# Patient Record
Sex: FEMALE | URBAN - METROPOLITAN AREA
[De-identification: names, ages, dates, MRNs, and addresses within clinical notes are randomized per-mention and may not be internally consistent; named-entity substitution may affect disease eponyms.]

---

## 2017-06-01 ENCOUNTER — HOSPITAL ENCOUNTER (OUTPATIENT)
Dept: LAB | Age: 42
Discharge: HOME | End: 2017-06-01

## 2017-06-01 ENCOUNTER — OFFICE VISIT (OUTPATIENT)
Dept: DERMATOLOGY | Facility: AMBULATORY SURGERY CENTER | Age: 42
End: 2017-06-01

## 2017-06-01 VITALS
SYSTOLIC BLOOD PRESSURE: 108 MMHG | RESPIRATION RATE: 18 BRPM | HEART RATE: 87 BPM | WEIGHT: 180 LBS | DIASTOLIC BLOOD PRESSURE: 64 MMHG | HEIGHT: 66 IN | OXYGEN SATURATION: 97 % | BODY MASS INDEX: 28.93 KG/M2 | TEMPERATURE: 98 F

## 2017-06-01 DIAGNOSIS — Z80.8 FAMILY HISTORY OF SKIN CANCER: ICD-10-CM

## 2017-06-01 DIAGNOSIS — L91.8 CUTANEOUS SKIN TAGS: ICD-10-CM

## 2017-06-01 DIAGNOSIS — D18.01 CHERRY ANGIOMA: ICD-10-CM

## 2017-06-01 DIAGNOSIS — D48.5 NEOPLASM OF UNCERTAIN BEHAVIOR OF SKIN OF NECK: Primary | ICD-10-CM

## 2017-06-01 DIAGNOSIS — L81.4 LENTIGINES: ICD-10-CM

## 2017-06-01 DIAGNOSIS — L82.1 SEBORRHEIC KERATOSES: ICD-10-CM

## 2017-06-01 DIAGNOSIS — D48.5 NEOPLASM OF UNCERTAIN BEHAVIOR OF SKIN OF BACK: ICD-10-CM

## 2017-06-01 DIAGNOSIS — D22.9 MULTIPLE BENIGN NEVI: ICD-10-CM

## 2017-06-01 NOTE — MR AVS SNAPSHOT
Visit Information Date & Time Provider Department Dept. Phone Encounter #  
 6/1/2017  9:00 AM [facility] [address] [phone] [id number] Upcoming Health Maintenance Date Due DTaP/Tdap/Td series (1 - Tdap) 1/18/1994 PAP AKA CERVICAL CYTOLOGY 1/18/1994 INFLUENZA AGE 9 TO ADULT 8/1/2017 Allergies as of 6/1/2017  Review Complete On: 6/1/2017 By: Miguel Monique LPN Severity Noted Reaction Type Reactions Pitocin [Oxytocin]  06/01/2017    Palpitations Current Immunizations  Never Reviewed No immunizations on file. Not reviewed this visit Vitals BP Pulse Temp Resp Height(growth percentile) Weight(growth percentile) 108/64 87 98 °F (36.7 °C) (Oral) 18 5' 6\" (1.676 m) 180 lb (81.6 kg) SpO2 BMI OB Status Smoking Status 97% 29.05 kg/m2 Having regular periods Never Smoker BMI and BSA Data Body Mass Index Body Surface Area 29.05 kg/m 2 1.95 m 2 Your Updated Medication List  
  
   
This list is accurate as of: 6/1/17  9:28 AM.  Always use your most recent med list.  
  
  
  
  
 LAMICTAL ODT PO Take  by mouth. LITHIUM ASPARTATE PO Take  by mouth. TRILEPTAL PO Take  by mouth. Patient Instructions Self Skin Exam and Sunscreens Early detection and treatment is essential in the treatment of all forms of skin cancer. If caught early, all forms of skin cancer are curable. In addition to your regular visits, you should perform a monthly skin examination. Over time, you become familiar with what is normally found on your skin and can identify new or suspicious spots. One of the screening tools you can use to assess your skin is to follow the ABCDEs: 
 
A= Asymmetry (One half is unlike the other half) B= Border (An irregular, scalloped or poorly defined edge) C= Color (Is varied from one area to another, has shades of tan, brown/ black,       white, red or blue) D= Diameter (Spots larger than 6mm or a pencil eraser) E= Evolving (New spots or one that is changing in size, shape, or color) A follow- up interval will be customized based on your history of skin cancer or level of skin damage and risk factors. In any case, if you notice a suspicious or new spot, an appointment should be arranged between regular visits. Everyone should use sunscreen and sun-safe practices, which is especially important for those with a personal or family history of skin cancer. Suggestions for this include: 1. Use daily moisturizers containing SPF 30 or higher. 2. Wear long sleeve clothing with UPF ratings and a broad-brimmed hat. 3. Apply sunscreen with SPF 30 or higher to all sun exposed areas if you are going to be in the sun. A broad spectrum UVA/ UVB sunscreen is best.  Dont forget to REAPPLY every two hours or more often if swimming or sweating! 4. Avoid outside activities during peak sun hours, especially in the summer (10am- 2pm). 5. DO NOT use tanning beds. Using sunscreen and sun-safe practices can help reduce the likelihood of developing skin cancer or additional skin cancers in those previously diagnosed. Introducing [facility]! Rafal Coughlin introduces [employer] patient portal. Now you can access parts of your medical record, email your doctor's office, and request medication refills online. 1. In your internet browser, go to [email or web address] 2. Click on the First Time User? Click Here link in the Sign In box. You will see the New Member Sign Up page. 3. Enter your [employer] Access Code exactly as it appears below. You will not need to use this code after youve completed the sign-up process. If you do not sign up before the expiration date, you must request a new code. · [employer] Access Code: [email or web address] Expires: 8/30/2017  9:28 AM 
 
 4. Enter the last four digits of your Social Security Number (xxxx) and Date of Birth (mm/dd/yyyy) as indicated and click Submit. You will be taken to the next sign-up page. 5. Create a [employer] ID. This will be your [employer] login ID and cannot be changed, so think of one that is secure and easy to remember. 6. Create a [employer] password. You can change your password at any time. 7. Enter your Password Reset Question and Answer. This can be used at a later time if you forget your password. 8. Enter your e-mail address. You will receive e-mail notification when new information is available in [address] 9. Click Sign Up. You can now view and download portions of your medical record. 10. Click the Download Summary menu link to download a portable copy of your medical information. If you have questions, please visit the Frequently Asked Questions section of the [employer] website. Remember, [employer] is NOT to be used for urgent needs. For medical emergencies, dial 911. Now available from your iPhone and Android! Please provide this summary of care documentation to your next provider. If you have any questions after today's visit, please call [phone].

## 2017-06-01 NOTE — PROGRESS NOTES
Name: Pauly Jovel       Age: 38 y.o. Date: 6/1/2017    Chief Complaint:   Chief Complaint   Patient presents with    Skin Exam     area of concern right cheek, skin exam       Subjective:    HPI  Ms. Pauly Jovel is a 38 y.o. female who presents as a new patient to [place] [address] for a skin exam.  The patient was referred for this evaluation by her father. The patient has not had a skin exam in the past and the patient does have current complaints related to her skin. She notices a dark lesion that has grown and changed colors on the right neck. She is also noticed a scaly lesion on the left upper forehead or hairline. She states she has skin tags on her neck that has occurred since the birth of her son about 9 months ago. Ms. Pauly Jovel is feeling well and in her usual state of health today. The patient's pertinent skin history includes: No personal history of skin cancer but does have a family history of skin cancer in her father. Admits to sun exposure and freckles. ROS: Constitutional: Negative. Dermatological :positive for - skin lesion changes    Social History     Social History    Marital status:      Spouse name: N/A    Number of children: N/A    Years of education: N/A     Occupational History    Not on file. Social History Main Topics    Smoking status: Never Smoker    Smokeless tobacco: Not on file    Alcohol use No    Drug use: No    Sexual activity: Not on file     Other Topics Concern    Not on file     Social History Narrative    No narrative on file       History reviewed. No pertinent family history. Past Medical History:   Diagnosis Date    Bipolar 1 disorder ([facility] [address]     Family history of skin cancer     Dad BCC    Sun-damaged skin        History reviewed. No pertinent surgical history. Current Outpatient Prescriptions   Medication Sig Dispense Refill    LITHIUM ASPARTATE PO Take  by mouth.       LAMOTRIGINE (LAMICTAL ODT PO) Take  by mouth.  OXCARBAZEPINE (TRILEPTAL PO) Take  by mouth. Allergies   Allergen Reactions    Pitocin [Oxytocin] Palpitations         Objective:    Visit Vitals    /64    Pulse 87    Temp 98 °F (36.7 °C) (Oral)    Resp 18    Ht 5' 6\" (1.676 m)    Wt 81.6 kg (180 lb)    SpO2 97%    BMI 29.05 kg/m2       Katiuska Ko is a 38 y.o. female who appears well and in no distress. She is awake, alert, and oriented. There is no preauricular, submandibular, or cervical lymphadenopathy. A skin examination was performed including her scalp, face (including eyelid), ears, neck, chest, back, abdomen, upper extremities (including digits/nails), lower extremities, breasts, buttocks; genital skin was not examined. There are lentigines on sun exposed areas. On the right lateral neck is a keratotic dark brown pigmented papule, 9 x 7 mm consistent with a seborrheic keratosis, inflamed. There is another scaly papule on the right upper forehead consistent with an additional seborrheic keratosis. She has skin tags on the neck. There are scattered cherry angiomas and other non inflamed waxy macules and keratotic papules consistent with seborrheic keratoses. She has medium brown and dark brown junctional nevi without concerning features. On the upper back is a 4 x 3 mm pearly papule with plenty changes, intradermal nevus versus basal cell carcinomas considered. Assessment/Plan:  1. Neoplasm of Uncertain Behavior, right lateral neck, favor SK. The differential diagnoses were discussed. A shave removal was advised to address this lesion. The procedure was reviewed and verbal and written consent were obtained. The risks of pain, bleeding, infection, recurrence and scar were discussed. I performed the procedure. The site was cleansed and anesthetized with 1% Lidocaine with Epinephrine 1:100,000. A shave removal was performed to remove the lesion in its clinical entirety.   Drysol was used for hemostasis. The wound was bandaged and care reviewed. The specimen was sent to pathology. I will contact the patient with the results and any further treatment that may be necessary. 2.Neoplasm of Uncertain Behavior, upper back, r/o BCC vs nevus. The differential diagnoses were discussed. Curettage was advised to address this lesion with malignant destruction. The procedure was reviewed and verbal and written consent were obtained. The risks of pain, bleeding, infection, scar, and recurrence of the lesion were discussed. I performed the procedure. The site was cleansed and anesthetized with 1% Lidocaine with Epinephrine 1:100,000. Curettage was performed by me to include a 2 mm margin, resulting in a post curettage defect size of 8 x 7 mm. Drysol was used for hemostasis. The wound was bandaged and care reviewed. The specimen was sent to pathology. I will contact the patient with the results and any further treatment that may be necessary. 3.Solar lentigos. The diagnosis and relationship to sun exposure was reviewed. Sun protection advised. 4. Seborrheic keratoses. The diagnosis was reviewed and the patient was reassured that no treatment is needed for these benign lesions. 5.Cherry angiomas. The diagnosis was reviewed and the patient was reassured that no treatment is needed for these benign lesions. 6. Normal nevi. The diagnosis of normal nevi was reviewed. I discussed sun protection, sunscreen use, the warning signs of skin cancer, the need for self-skin examinations, and the need for regular practitioner exams every 1 year. The patient should follow up sooner as needed if new, changing, or symptomatic skin lesions arise. 7. Family history of skin cancer. I discussed sun protection, sunscreen use, the warning signs of skin cancer, the need for self-skin examinations, and the need for regular practitioner exams every 1 year.   The patient should follow up sooner as needed if new, changing, or symptomatic skin lesions arise. [facility]   OFFICE PROCEDURE PROGRESS NOTE   Chart reviewed for the following:   Celso MARTINEZ, have reviewed the History, Physical and updated the Allergic reactions for [address] performed immediately prior to start of procedure:   Celso MARTINEZ, have performed the following reviews on Sarahy Lynn   prior to the start of the procedure:     * Patient was identified by name and date of birth   * Agreement on procedure being performed was verified   * Risks and Benefits explained to the patient   * Procedure site verified and marked as necessary   * Patient was positioned for comfort   * Consent was signed and verified     Time: 0935  Date of procedure: 6/1/2017  Procedure performed by: Kristin Landis.  Mercedes Antonio  Provider assisted by: lpn   Patient assisted by: self   How tolerated by patient: tolerated the procedure well with no complications   Comments: none

## 2017-06-06 ENCOUNTER — TELEPHONE (OUTPATIENT)
Dept: DERMATOLOGY | Facility: AMBULATORY SURGERY CENTER | Age: 42
End: 2017-06-06

## 2017-06-06 NOTE — PROGRESS NOTES
I spoke with her mom, Pati Kay. We discussed the diagnosis of benign seborrheic keratosis that was inflamed in her neck, and basal cell carcinoma on the upper back. The basal cell carcinoma was treated with curettage at that visit. Her mother states that each spot is doing well. She will reviewed the diagnosis with her daughter and if her daughter has any questions I encouraged her to call.